# Patient Record
(demographics unavailable — no encounter records)

---

## 2024-10-16 NOTE — PHYSICAL EXAM
[Well Developed] : well developed [Well Nourished] : well nourished [No Acute Distress] : no acute distress [Normal Conjunctiva] : normal conjunctiva [Normal Venous Pressure] : normal venous pressure [No Carotid Bruit] : no carotid bruit [Normal S1, S2] : normal S1, S2 [No Murmur] : no murmur [No Rub] : no rub [No Gallop] : no gallop [Clear Lung Fields] : clear lung fields [Good Air Entry] : good air entry [No Respiratory Distress] : no respiratory distress  [Soft] : abdomen soft [Non Tender] : non-tender [Normal Gait] : normal gait [No Edema] : no edema [No Cyanosis] : no cyanosis [No Rash] : no rash [No Skin Lesions] : no skin lesions [Moves all extremities] : moves all extremities [No Focal Deficits] : no focal deficits [Normal Speech] : normal speech [Alert and Oriented] : alert and oriented [de-identified] : left forearm fistula

## 2024-10-16 NOTE — CARDIOLOGY SUMMARY
[de-identified] : 10/16/24 - normal sinus rhythm, poor R-wave progression [de-identified] : 12/30/22 (regadenoson MIBI) - medium sized moderate predominantly fixed defect in the basal to mid inferior wall and basal inferoseptum consistent with infarct with mild nasir-infarct ischemia, LVEF >70% 09/14/21 (regadenoson tetrofosmin) - medium sized, moderate defects in inferolateral, proximal to mid inferior walls that are reversible, suggestive of ischemia, LVEF 55% [de-identified] : 09/14/21 - MAC, mild-mod MR, mild diastolic dysfunction, normal RV size and function, LVEF 60% [de-identified] : 11/14/23 (PCI) - SYNERGY stents to mRCA 80% and mRCA 99% 11/14/23 (CATH) - pLAD 20%, pCx 40%, mCx 40%, pOM1 50% (small caliber), oRCA 30%, mRCA 80%, mRCA 99% 10/12/21 (PCI) - unsuccessful PCI attempt of mRCA 100% 10/12/21 (CATH) - pLAD 20%, mLAD 20%, pD2 30% (small), pOM3 40% (small), oRCA 40%, mRCA 100% with collaterals from LAD septals ()

## 2024-10-16 NOTE — DISCUSSION/SUMMARY
Rooming/Note    Pt presents to GI clinic with c/o: abdominal pain for about a year    Symptoms: heartburn, hard time finishing food and increasing pressure and belching near the bottom of his rib cage. No nausea/vomitting but does gag sometimes when eating.     Medications:   Verified, no  change    Refills: no  Latex Allergies: no    Communications:    Live well yes  Cell phone: yes ok to leave message     Family hx GI disorders: none        [Coronary Artery Disease] : coronary artery disease [Hypertension] : hypertension [Stable] : stable [FreeTextEntry1] : Currently stable from a cardiovascular standpoint. Normotensive. Appears euvolemic. Stable CAD (s/p mid RCA PCI) with preserved LV systolic function. No ischemic or CHF symptoms. Continue current medications including aspirin. Patient with history of statin intolerance. ECG completed today and reviewed. At this time, patient is considered an acceptable risk from a cardiac standpoint for renal transplant. Follow up in 6 months. [EKG obtained to assist in diagnosis and management of assessed problem(s)] : EKG obtained to assist in diagnosis and management of assessed problem(s)

## 2024-11-05 NOTE — PHYSICAL EXAM
[No Acute Distress] : no acute distress [Well-Appearing] : well-appearing [Normal Sclera/Conjunctiva] : normal sclera/conjunctiva [EOMI] : extraocular movements intact [No Lymphadenopathy] : no lymphadenopathy [Supple] : supple [No Respiratory Distress] : no respiratory distress  [No Accessory Muscle Use] : no accessory muscle use [Clear to Auscultation] : lungs were clear to auscultation bilaterally [Normal Rate] : normal rate  [Regular Rhythm] : with a regular rhythm [Normal S1, S2] : normal S1 and S2 [No Edema] : there was no peripheral edema [No Extremity Clubbing/Cyanosis] : no extremity clubbing/cyanosis [Soft] : abdomen soft [Non Tender] : non-tender [Non-distended] : non-distended [Normal Bowel Sounds] : normal bowel sounds [Normal Affect] : the affect was normal [Normal Insight/Judgement] : insight and judgment were intact [de-identified] : L forearm AV fistula + thrill

## 2024-11-05 NOTE — HISTORY OF PRESENT ILLNESS
[FreeTextEntry1] : cpe/est care [de-identified] : RAI BOGGS is a 72 year F who presents for CPE/est care PMHx ESRD on HD (MWF), CAD s/p stents, hypothyroidism  Feels well overall  Cardio Dr. Fran Solorio Gyn Dr. Bennett Gyn/onc Dr Cam [facility]/Dr. Pizarro

## 2024-11-05 NOTE — ASSESSMENT
[FreeTextEntry1] : Health Care Maintenance - well visit - routine labs 9/2024 reviewed - depression screen negative - ekg follows with cardio  - mammogram due, ordered - pap smear 3/2024 LSIL with high risk HPV, following with gyn/onc Dr. Martinez - colonoscopy 9/2024 GI Dr. Sepulveda - dexa- due, ordered  - flu vaccine reports 10/2024 - covid vaccines reports 3 doses  - tdap- recommend - shingles vaccine- recommend  - pneumonia vaccine- s/p prevnar 20 12/2022 - advised to get annual eye exams with optometry/ophthalmology, skin exams with dermatology, and dental exams  CAD s/p stents  HLD -   9/2024 -unable to tolerate statin, c/w nexletol and asa -follows cardio Dr. Jack Solorio -lipidologist referral given  ESRD on HD MWF -managed by nephro Dr. Pizarro -on transplant list, follows with transplant Dr. Batista  Anemia -recent H/H 10.2/32.1 -likely i/s/o of ESRD  Hypothyroidism -c/w synthroid   RTC in 3-6 months

## 2024-11-05 NOTE — HEALTH RISK ASSESSMENT
[No] : In the past 12 months have you used drugs other than those required for medical reasons? No [No falls in past year] : Patient reported no falls in the past year [0] : 2) Feeling down, depressed, or hopeless: Not at all (0) [PHQ-2 Negative - No further assessment needed] : PHQ-2 Negative - No further assessment needed [Never] : Never [Alone] : lives alone [Retired] : retired [] :  [# Of Children ___] : has [unfilled] children [Feels Safe at Home] : Feels safe at home [Fully functional (bathing, dressing, toileting, transferring, walking, feeding)] : Fully functional (bathing, dressing, toileting, transferring, walking, feeding) [Fully functional (using the telephone, shopping, preparing meals, housekeeping, doing laundry, using] : Fully functional and needs no help or supervision to perform IADLs (using the telephone, shopping, preparing meals, housekeeping, doing laundry, using transportation, managing medications and managing finances) [[id number]] : 0 [MammogramDate] : 11/23 [PapSmearDate] : 03/24 [PapSmearComments] : LSIL with high risk HPV, following with gyn/onc Dr. Martinez [BoneDensityDate] : 09/21 [ColonoscopyDate] : 09/24 [ColonoscopyComments] : GI Dr. Sepulveda  [de-identified] : daughter lives 1 block away

## 2024-11-05 NOTE — HEALTH RISK ASSESSMENT
[No] : In the past 12 months have you used drugs other than those required for medical reasons? No [No falls in past year] : Patient reported no falls in the past year [0] : 2) Feeling down, depressed, or hopeless: Not at all (0) [PHQ-2 Negative - No further assessment needed] : PHQ-2 Negative - No further assessment needed [Never] : Never [Alone] : lives alone [Retired] : retired [] :  [# Of Children ___] : has [unfilled] children [Feels Safe at Home] : Feels safe at home [Fully functional (bathing, dressing, toileting, transferring, walking, feeding)] : Fully functional (bathing, dressing, toileting, transferring, walking, feeding) [Fully functional (using the telephone, shopping, preparing meals, housekeeping, doing laundry, using] : Fully functional and needs no help or supervision to perform IADLs (using the telephone, shopping, preparing meals, housekeeping, doing laundry, using transportation, managing medications and managing finances) [[id number]] : 0 [MammogramDate] : 11/23 [PapSmearDate] : 03/24 [PapSmearComments] : LSIL with high risk HPV, following with gyn/onc Dr. Martinez [BoneDensityDate] : 09/21 [ColonoscopyDate] : 09/24 [ColonoscopyComments] : GI Dr. Sepulveda  [de-identified] : daughter lives 1 block away

## 2024-11-05 NOTE — PHYSICAL EXAM
[No Acute Distress] : no acute distress [Well-Appearing] : well-appearing [Normal Sclera/Conjunctiva] : normal sclera/conjunctiva [EOMI] : extraocular movements intact [No Lymphadenopathy] : no lymphadenopathy [Supple] : supple [No Respiratory Distress] : no respiratory distress  [No Accessory Muscle Use] : no accessory muscle use [Clear to Auscultation] : lungs were clear to auscultation bilaterally [Normal Rate] : normal rate  [Regular Rhythm] : with a regular rhythm [Normal S1, S2] : normal S1 and S2 [No Edema] : there was no peripheral edema [No Extremity Clubbing/Cyanosis] : no extremity clubbing/cyanosis [Soft] : abdomen soft [Non Tender] : non-tender [Non-distended] : non-distended [Normal Bowel Sounds] : normal bowel sounds [Normal Affect] : the affect was normal [Normal Insight/Judgement] : insight and judgment were intact [de-identified] : L forearm AV fistula + thrill

## 2024-11-05 NOTE — HISTORY OF PRESENT ILLNESS
[FreeTextEntry1] : cpe/est care [de-identified] : RAI BOGGS is a 72 year F who presents for CPE/est care PMHx ESRD on HD (MWF), CAD s/p stents, hypothyroidism  Feels well overall  Cardio Dr. Fran Solorio Gyn Dr. Bennett Gyn/onc Dr Cam [facility]/Dr. Pizarro

## 2025-01-28 NOTE — ASSESSMENT
[FreeTextEntry1] : In the office patient went a duplex study which shows a partially thrombosed fistula with poor flow.  Given these findings patient will need an urgent fistulogram and thrombectomy.  Risks and benefits were discussed with the patient who understands and agrees to proceed.  Patient to be sent over to the access center for scheduling either later today or tomorrow.

## 2025-01-28 NOTE — HISTORY OF PRESENT ILLNESS
[FreeTextEntry1] : 72-year-old female with multiple medical problems including coronary artery disease, hyperlipidemia, hypertension currently on hemodialysis via left radiocephalic AV fistula. Patient states that the fistula had been working well.  There was some issues yesterday at the end of hemodialysis.  She presents to the office today for duplex and for evaluation of the fistula. [] : left radiocephalic fistula

## 2025-01-28 NOTE — PHYSICAL EXAM
[Normal] : normal rate, regular rhythm, normal S1/S2, no murmur [Thrill] : no thrill [Pulsatile Thrill] : pulsatile thrill [Aneurysm] : no aneurysm [Bleeding] : no bleeding [Hand well perfused] : hand well perfused [Foot well perfused] : foot well perfused [Warm Extremities] : warm extremities

## 2025-01-29 NOTE — PAST MEDICAL HISTORY
[Increasing age ( >40 years old)] : Increasing age ( >40 years old) [No therapy indicated for cases scheduled for less than one hour] : No therapy indicated for cases scheduled for less than one hour. [FreeTextEntry1] : \par  \par  Malignant Hyperthermis (MH) Screening Tool and Risk of Bleeding Assessement\par  Ms. RAI BOGGS  denies family history of unexpected death following Anesthesia or Exercise.\par  Denies Family history of Malignant Hyperthermia, Muscle or Neuromuscular disorder and High Temperature following exercise.\par  \par  Ms. RAI BOGGS denies history of Muscle Spasm, Dark or Chocolate - Colored urine and Unanticipated fever immediately following anesthesia or serious exercise. \par  Ms. BOGGS  also denies bleeding tendencies/ Risks of Bleeding\par

## 2025-01-29 NOTE — HISTORY OF PRESENT ILLNESS
[] : right internal jugular tunneled catheter [FreeTextEntry1] : alert and oriented x3  accompanied by daughter Amy [phone] took synthroid and aspirin this morning     [FreeTextEntry2] : Right catheter exchange 6/3/22 [FreeTextEntry4] : Monday  [FreeTextEntry5] : yesterday 7pm [FreeTextEntry6] : Dr Rod

## 2025-01-29 NOTE — PROCEDURE
[D/C IV on discharge] : D/C IV on discharge [Resume diet] : resume diet [Site check for bleeding/hematoma] : Site check for bleeding/hematoma [Vital signs on admission the q 15 mins x2] : Vital signs on admission the q 15 mins x2 [FreeTextEntry1] : left fistula, thrombectomy  [FreeTextEntry3] : 2mg/3ml of tpa and sterile water instilled into left fistula by Dr Adams at 815am with 15cm unifuse catheter

## 2025-01-29 NOTE — PROCEDURE
Ok for early refill?    [phone]  Please call the pharmacy today    [D/C IV on discharge] : D/C IV on discharge [Resume diet] : resume diet [Site check for bleeding/hematoma] : Site check for bleeding/hematoma [Vital signs on admission the q 15 mins x2] : Vital signs on admission the q 15 mins x2 [FreeTextEntry1] : left fistula, thrombectomy  [FreeTextEntry3] : 2mg/3ml of tpa and sterile water instilled into left fistula by Dr Adams at 815am with 15cm unifuse catheter

## 2025-01-29 NOTE — HISTORY OF PRESENT ILLNESS
[] : right internal jugular tunneled catheter [FreeTextEntry1] : alert and oriented x3  accompanied by daughter Amy [phone] took synthroid and aspirin this morning     [FreeTextEntry2] : Right catheter exchange 6/3/22 [FreeTextEntry4] : Monday  [FreeTextEntry5] : yesterday 7pm [FreeTextEntry6] : Dr Rod

## 2025-01-29 NOTE — ASSESSMENT
[Other: _____] : [unfilled] [FreeTextEntry1] : Patient presents with clotted access, plan for left fistula thrombectomy

## 2025-04-16 NOTE — DISCUSSION/SUMMARY
[Coronary Artery Disease] : coronary artery disease [Hypertension] : hypertension [Stable] : stable [FreeTextEntry1] : Currently stable from a cardiovascular standpoint. Normotensive. Appears euvolemic. Stable CAD (s/p mid RCA PCI) with preserved LV systolic function. No ischemic or CHF symptoms. Will start Repatha for management of elevated LDL. Continue current medications including aspirin. Patient with history of statin intolerance. ECG completed today and reviewed. Will schedule a pharmacologic nuclear stress test for cardiac risk stratification. In addition, will schedule an echo to reassess her cardiac structures and function. At this time, patient is considered an acceptable risk from a cardiac standpoint for renal transplant. Follow up in 6 months. [EKG obtained to assist in diagnosis and management of assessed problem(s)] : EKG obtained to assist in diagnosis and management of assessed problem(s)

## 2025-04-16 NOTE — CARDIOLOGY SUMMARY
[de-identified] : 04/16/25 - normal sinus rhythm, poor R-wave progression, nonspecific ST abnormality [de-identified] : 12/30/22 (regadenoson MIBI) - medium sized moderate predominantly fixed defect in the basal to mid inferior wall and basal inferoseptum consistent with infarct with mild nasir-infarct ischemia, LVEF >70% 09/14/21 (regadenoson tetrofosmin) - medium sized, moderate defects in inferolateral, proximal to mid inferior walls that are reversible, suggestive of ischemia, LVEF 55% [de-identified] : 09/14/21 - MAC, mild-mod MR, mild diastolic dysfunction, normal RV size and function, LVEF 60% [de-identified] : 11/14/23 (PCI) - SYNERGY stents to mRCA 80% and mRCA 99% 11/14/23 (CATH) - pLAD 20%, pCx 40%, mCx 40%, pOM1 50% (small caliber), oRCA 30%, mRCA 80%, mRCA 99% 10/12/21 (PCI) - unsuccessful PCI attempt of mRCA 100% 10/12/21 (CATH) - pLAD 20%, mLAD 20%, pD2 30% (small), pOM3 40% (small), oRCA 40%, mRCA 100% with collaterals from LAD septals ()

## 2025-04-16 NOTE — PHYSICAL EXAM
[Well Developed] : well developed [Well Nourished] : well nourished [No Acute Distress] : no acute distress [Normal Conjunctiva] : normal conjunctiva [Normal Venous Pressure] : normal venous pressure [No Carotid Bruit] : no carotid bruit [Normal S1, S2] : normal S1, S2 [No Murmur] : no murmur [No Rub] : no rub [No Gallop] : no gallop [Clear Lung Fields] : clear lung fields [Good Air Entry] : good air entry [No Respiratory Distress] : no respiratory distress  [Soft] : abdomen soft [Non Tender] : non-tender [Normal Gait] : normal gait [No Edema] : no edema [No Cyanosis] : no cyanosis [No Rash] : no rash [No Skin Lesions] : no skin lesions [Moves all extremities] : moves all extremities [No Focal Deficits] : no focal deficits [Normal Speech] : normal speech [Alert and Oriented] : alert and oriented [de-identified] : left forearm fistula

## 2025-04-22 NOTE — HISTORY OF PRESENT ILLNESS
[FreeTextEntry1] : 72-year-old female with multiple medical problems including coronary artery disease, hyperlipidemia, hypertension currently on hemodialysis via left radiocephalic AV fistula who presents the office today for surveillance. Patient reports sluggish flow in distal cannulation site. [] : left radiocephalic fistula

## 2025-04-22 NOTE — PHYSICAL EXAM
[Normal] : normal rate, regular rhythm, normal S1/S2, no murmur [Thrill] : no thrill [Pulsatile Thrill] : pulsatile thrill [Aneurysm] : no aneurysm [Bleeding] : no bleeding [Hand well perfused] : hand well perfused [Foot well perfused] : foot well perfused [Warm Extremities] : warm extremities [de-identified] : Intact

## 2025-04-22 NOTE — ASSESSMENT
[FreeTextEntry1] : 72-year-old female with end-stage renal disease currently on hemodialysis via left radiocephalic AV fistula.  In the office today, patient underwent duplex which demonstrates a high-grade stenosis in the fistula with inadequate flow.  Given these findings, patient to be scheduled for left arm fistulogram.

## 2025-05-08 NOTE — PHYSICAL EXAM
[Well Developed] : well developed [No Acute Distress] : no acute distress [Normocephalic] : normocephalic [Atraumatic] : atraumatic [Sclera Anicteric] : sclera anicteric [Neck Supple] : neck supple [Breathing Comfortably on RA] : breathing comfortably on room air [Soft] : soft [Non-tender] : non-tender [In Left Arm] : fistula/graft in left arm [Alert] : alert [Responds to Questions Appropriately] : responds to questions appropriately [Oriented] : oriented [Appropriate] : appropriate [Normal] : normal [Clean] : clean [Dry] : dry [Healing Well] : healing well [Bleeding] : no active bleeding [Foul Odor] : no foul smell [Purulent Drainage] : no purulent drainage [Serosanguinous Drainage] : no serosanguinous drainage [Erythema] : not erythematous [Warm] : not warm [Tender] : not tender

## 2025-05-08 NOTE — REVIEW OF SYSTEMS
[Sclera anicteric] : sclera anicteric [Fever] : no fever [Chills] : no chills [Recent Weight Gain (___ Lbs)] : no recent weight gain [Recent Weight Loss (___ Lbs)] : no recent weight loss [Sore throat] : no sore throat [Pain/Stiffness] : no pain/stiffness [Trauma] : no trauma [Chest Pain] : no chest pain [Palpitations] : no palpitations [SOB] : no shortness of breath [Wheezing] : no wheezing [Cough] : no cough [Abdominal Pain] : no abdominal pain [Nausea] : no nausea [Constipation] : no constipation [Diarrhea] : diarrhea [Vomiting] : no vomiting [Dysuria] : no dysuria [Hematuria] : no hematuria [UTI] : no UTI [Itching] : no itching [Skin Rash] : no skin rash [Headache] : no headache [Dizziness] : no dizziness [Fainting] : no fainting [Confusion] : no confusion [Seizures] : no seizures [Adenopathy] : no adenopathy

## 2025-05-08 NOTE — REASON FOR VISIT
[Initial] : an initial visit  [Family Member] : family member [FreeTextEntry3] : DDRT [FreeTextEntry5] : 04/28/2025

## 2025-05-08 NOTE — HISTORY OF PRESENT ILLNESS
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      - Interval events: 05/07/2025  Patient presents to the office today for her first follow up visit after discharge on 05/05/2025   Patient reports feeling well with no significant complaint at this time.   Voids normally >> 800 ml daily. Still on HD > Getting a session today in the afternoon.  2 Drains are still in > Daughter was measuring the output collectively from both of them >> Advised to measure them separately.  Wound is healing nicely.   Patient reports average appetite, and normal bowel movements.   Physical activity is improving.   Patient denies fever, chills, chest pain, SOB, abdominal pain, nausea, vomiting, headache, dizziness or seizures.

## 2025-05-08 NOTE — PLAN
[FreeTextEntry1] : - Patient is recovering well after surgery. Just left the hospital 2 days ago.  - Graft function is delayed > on HD, and getting a session today. UOP is 800 daily and is increasing >> will keep monitoring UOP and Labs to decide on when to hold HD.  - Labs were sent today and will be reviewed  - Medications were reviewed.         - Will adjust Tacro dose based on today's level. - Needs to see a cardiologist for the PVCs.  - Ureteral stent still in >> Will take out in 4-6 weeks post transplant  Patient to follow up as per our protocol.

## 2025-05-15 NOTE — ASSESSMENT
[FreeTextEntry1] : Renal Transplant recipient: Noted allograft function, creatinine at discharge, gabrielle creatinine. Reviewed for urinary symptoms/fever/chills/pain/new symptoms. Tolerating medications. DGF, Last hemodialysis 1 week prior, now non oliguric. Drains removed today, has >1L urine output. Will monitor for continued recovery of kisdney function Lab data from last visit reviewed including allograft function, urinalysis, any viremia and trough level of medication as well as any imaging reports. HCV: Being started on Epclusa from today Immunosuppression: reviewed; Noted induction regimen, maintenance regimen and reviewed target trough level. Has flow sheets to maintain home charts of glucose , blood pressure, temperature, weight and urine output. Hypertension: controlled; Reviewed medications.  Reviewed target for blood pressure control. Cardiovascular risk reduction, primary/secondary prevention measures were discussed as appropriate.   Prophylaxis: Reviewed antimicrobial and GI prophylaxis as well as precautions to prevent infections. Discussed ambulation, using incentive spirometer, optimal glucose and blood pressure readings, adherence with medications and follow ups, follow up clinic visit schedule, avoiding dehydration, mosquito bites; prevention of DVT as well as food safety. Patient has met with transplant surgeon post transplant; post op wound care ureteral stent removal and follow up care were discussed. Advised to bring flow sheets and medication list at every visit. Discussed opthalmology and dermatology checks at least yearly and vaccinations. Flu vaccine yearly and Pneumonia vaccine every 5 years. Copy of office visit and lab reports are being sent to primary physician and referring nephrologist.

## 2025-05-15 NOTE — REASON FOR VISIT
[Follow-Up] : a follow-up visit [Other: _____] : [unfilled] [FreeTextEntry1] : Here for post transplant follow up visit

## 2025-05-15 NOTE — HISTORY OF PRESENT ILLNESS
[FreeTextEntry1] : She is here for post follow up. Last dialysis was on 5/9/25. Reports she is passing >1000 ml/urine. Has discomfort from Drains, only 5-10 ml/day for last 2-3 days from each drain. Reports no fever/dysuria/hematuria. Feels good appetite, sleeps ok. Reports no SOB.   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 Pt progressed well from a surgical perspective. Tolerated regular diet, had bowel function, had good pain control with minimal narcotics, and ambulated well with PT. She tolerated all of the new immunosuppression medication regimen. Medication/diet education was provided regularly by Transplant Pharmacy and Nutrition teams. She was followed closely by our multidisciplinary transplant team:  Surgeons, Hepatologists, ID, Nephrologists, Pharmacists, [place], ACPs, RNs, SW, Coordinators, Dieticians, PT/OT and was deemed safe for discharge with the  following plan:    [] DDRT, HCV+ donor - Renal Doppler: patent w some elevated velocities --> repeat stable, patent, homogenous flow - DGF: HD 4/29, 5/1, 5/3, 5/5 to continue HD outpatient MWF - HCV treatment pending HCV genotype, to follow up outpatient - d/cd with 2 ESTEFANI drains [] Immuno - Completed Thymo induction (total 300 mg) -FK 8,  BID, Pred 5 -PPx: bactrim/Valcyte/Nystatin/Famotidine  [] HTN/CAD - ASA 81 - Frequent PVCs on tele, asymptomatic: Cards c/s - coreg 6.25 bid     Medications   aspirin 81 mg /d Bactrim 400 mg-80 mg /d famotidine 20 mg /d Lasix 80 mg once daily levothyroxine 75 mcg /d mycophenolate mofetil 250 mg oral capsule: 500 mg BID nystatin 100,000 units/mL oral suspension: 5 milliliter(s) orally 4 times a day predniSONE 5 mg /d Envarsus 7 mg/d valGANciclovir 450 mg TIW Senna 1 tablet/d Epclusa  daily starting 5/15/25)- picking up from [facility] today Magnesium Oxide 400 mg once daily   Drain output 5/10 ml/d for 3 days, removed today (5/15/25)

## 2025-05-15 NOTE — PHYSICAL EXAM
[General Appearance - Alert] : alert [General Appearance - In No Acute Distress] : in no acute distress [Sclera] : the sclera and conjunctiva were normal [Outer Ear] : the ears and nose were normal in appearance [Jugular Venous Distention Increased] : there was no jugular-venous distention [Auscultation Breath Sounds / Voice Sounds] : lungs were clear to auscultation bilaterally [Heart Sounds Gallop] : no gallops [Heart Sounds Pericardial Friction Rub] : no pericardial rub [Edema] : there was no peripheral edema [Abdomen Soft] : soft [Cervical Lymph Nodes Enlarged Posterior Bilaterally] : posterior cervical [Cervical Lymph Nodes Enlarged Anterior Bilaterally] : anterior cervical [Involuntary Movements] : no involuntary movements were seen [___ (cm) Fistula] : [unfilled] (cm) fistula [] : no rash [No Focal Deficits] : no focal deficits [Oriented To Time, Place, And Person] : oriented to person, place, and time [Impaired Insight] : insight and judgment were intact [FreeTextEntry1] : DULCE ESTEFANI drains, staples+

## 2025-05-22 NOTE — HISTORY OF PRESENT ILLNESS
[FreeTextEntry1] : She is here for post follow up. Last dialysis was on 5/9/25. 1700 to 2000 ml urine Reports no fever/dysuria/hematuria. Feels good appetite, sleeps ok. Reports no SOB.   72F PMH CAD s/p stents x2 (on ASA81), ESRD on HD MWF via LUE AVF (had HD catheter initially) since 2021 now s/p HCV + DDRT with 1a/1v/1u with stent under thymo induction, course complicated by delayed graft function requiring HD and elevated lactate that cleared with minimal intervention. Pt progressed well from a surgical perspective. Tolerated regular diet, had bowel function, had good pain control with minimal narcotics, and ambulated well with PT. She tolerated all of the new immunosuppression medication regimen. Medication/diet education was provided regularly by Transplant Pharmacy and Nutrition teams. She was followed closely by our multidisciplinary transplant team:  Surgeons, Hepatologists, ID, Nephrologists, Pharmacists, [place], ACPs, RNs, SW, Coordinators, Dieticians, PT/OT and was deemed safe for discharge with the  following plan:    [] DDRT, HCV+ donor - Renal Doppler: patent w some elevated velocities --> repeat stable, patent, homogenous flow - DGF: Recovered - HCV treatment pending HCV genotype, to follow up outpatient - d/cd   ESTEFANI drains [] Immuno - Completed Thymo induction (total 300 mg) -FK 8,  BID, Pred 5 -PPx: bactrim/Valcyte/Nystatin/Famotidine  [] HTN/CAD - ASA 81 - Frequent PVCs on tele, asymptomatic: Cards c/s - coreg 6.25 bid     Medications   aspirin 81 mg /d Bactrim 400 mg-80 mg /d famotidine 20 mg /d Lasix 80 mg once daily- changed to 40 mg alternate levothyroxine 75 mcg /d mycophenolate mofetil 250 mg oral capsule: 500 mg BID nystatin 100,000 units/mL oral suspension: 5 milliliter(s) orally 4 times a day predniSONE 5 mg /d Envarsus 7 mg/d valGANciclovir 450 mg TIW Senna 1 tablet/d Epclusa  daily (started 5/15/25) Magnesium Oxide 400 mg once daily   Drains have been removed. Staples + stent+

## 2025-05-22 NOTE — PHYSICAL EXAM
[General Appearance - Alert] : alert [General Appearance - In No Acute Distress] : in no acute distress [Sclera] : the sclera and conjunctiva were normal [Outer Ear] : the ears and nose were normal in appearance [Jugular Venous Distention Increased] : there was no jugular-venous distention [Auscultation Breath Sounds / Voice Sounds] : lungs were clear to auscultation bilaterally [Heart Sounds Gallop] : no gallops [Heart Sounds Pericardial Friction Rub] : no pericardial rub [Edema] : there was no peripheral edema [Abdomen Soft] : soft [FreeTextEntry1] : DULCE JARA drain site healing, staples+ [Cervical Lymph Nodes Enlarged Posterior Bilaterally] : posterior cervical [Cervical Lymph Nodes Enlarged Anterior Bilaterally] : anterior cervical [Involuntary Movements] : no involuntary movements were seen [___ (cm) Fistula] : [unfilled] (cm) fistula [] : no rash [No Focal Deficits] : no focal deficits [Oriented To Time, Place, And Person] : oriented to person, place, and time [Impaired Insight] : insight and judgment were intact

## 2025-05-22 NOTE — ASSESSMENT
[FreeTextEntry1] : Renal Transplant recipient: Noted allograft function, creatinine at discharge, gabrielle creatinine. Reviewed for urinary symptoms/fever/chills/pain/new symptoms. Tolerating medications. Will monitor for continued recovery of kidney function Lab data from last visit reviewed including allograft function, urinalysis, any viremia and trough level of medication as well as any imaging reports. HCV: Being started on Epclusa from 5/15 Bacteriuria: Started on Cipro. Immunosuppression: reviewed; Noted induction regimen, maintenance regimen and reviewed target trough level. Has flow sheets to maintain home charts of glucose , blood pressure, temperature, weight and urine output. Hypertension: controlled; Reviewed medications.  Reviewed target for blood pressure control. Cardiovascular risk reduction, primary/secondary prevention measures were discussed as appropriate.   Prophylaxis: Reviewed antimicrobial and GI prophylaxis as well as precautions to prevent infections. Discussed ambulation, using incentive spirometer, optimal glucose and blood pressure readings, adherence with medications and follow ups, follow up clinic visit schedule, avoiding dehydration, mosquito bites; prevention of DVT as well as food safety. Patient has met with transplant surgeon post transplant; post op wound care ureteral stent removal and follow up care were discussed. Advised to bring flow sheets and medication list at every visit. Discussed ophthalmology and dermatology checks at least yearly and vaccinations. Flu vaccine yearly and Pneumonia vaccine every 5 years. Copy of office visit and lab reports are being sent to primary physician and referring nephrologist.

## 2025-06-18 NOTE — HISTORY OF PRESENT ILLNESS
[FreeTextEntry1] : Currently doing okay. Denies chest pain, shortness of breath or palpitations. Patient had HCV+ DDRT on 04/28/25. Course complicated by delayed graft function.

## 2025-06-18 NOTE — CARDIOLOGY SUMMARY
[de-identified] : 04/16/25 - normal sinus rhythm, poor R-wave progression, nonspecific ST abnormality [de-identified] : 12/30/22 (regadenoson MIBI) - medium sized moderate predominantly fixed defect in the basal to mid inferior wall and basal inferoseptum consistent with infarct with mild nasir-infarct ischemia, LVEF >70% 09/14/21 (regadenoson tetrofosmin) - medium sized, moderate defects in inferolateral, proximal to mid inferior walls that are reversible, suggestive of ischemia, LVEF 55% [de-identified] : 09/14/21 - MAC, mild-mod MR, mild diastolic dysfunction, normal RV size and function, LVEF 60% [de-identified] : 11/14/23 (PCI) - SYNERGY stents to mRCA 80% and mRCA 99% 11/14/23 (CATH) - pLAD 20%, pCx 40%, mCx 40%, pOM1 50% (small caliber), oRCA 30%, mRCA 80%, mRCA 99% 10/12/21 (PCI) - unsuccessful PCI attempt of mRCA 100% 10/12/21 (CATH) - pLAD 20%, mLAD 20%, pD2 30% (small), pOM3 40% (small), oRCA 40%, mRCA 100% with collaterals from LAD septals ()

## 2025-06-18 NOTE — PHYSICAL EXAM
[Well Developed] : well developed [Well Nourished] : well nourished [No Acute Distress] : no acute distress [Normal Conjunctiva] : normal conjunctiva [Normal Venous Pressure] : normal venous pressure [No Carotid Bruit] : no carotid bruit [Normal S1, S2] : normal S1, S2 [No Murmur] : no murmur [No Rub] : no rub [No Gallop] : no gallop [Clear Lung Fields] : clear lung fields [Good Air Entry] : good air entry [No Respiratory Distress] : no respiratory distress  [Soft] : abdomen soft [Non Tender] : non-tender [Normal Gait] : normal gait [No Edema] : no edema [No Cyanosis] : no cyanosis [No Rash] : no rash [No Skin Lesions] : no skin lesions [Moves all extremities] : moves all extremities [No Focal Deficits] : no focal deficits [Normal Speech] : normal speech [Alert and Oriented] : alert and oriented [de-identified] : left forearm fistula

## 2025-06-19 NOTE — HISTORY OF PRESENT ILLNESS
[FreeTextEntry1] : She is here for post follow up. Recovered DGF. Has been off dialysis after 5/9/25 (last session).   Reports no fever/dysuria/hematuria. Feels good appetite, sleeps ok.   Has seen Dr. Solorio cardiologist No fever/dysuria.  History reviewed 72F PMH CAD s/p stents x2 (on ASA81), ESRD on HD MWF via LUE AVF (had HD catheter initially) since 2021 now s/p HCV + DDRT with 1a/1v/1u with stent under thymo induction, course complicated by delayed graft function requiring HD and elevated lactate that cleared with minimal intervention. Pt progressed well from a surgical perspective. Tolerated regular diet, had bowel function, had good pain control with minimal narcotics, and ambulated well with PT. She tolerated all of the new immunosuppression medication regimen. Medication/diet education was provided regularly by Transplant Pharmacy and Nutrition teams.     [] DDRT, HCV+ donor - Renal Doppler: patent w some elevated velocities --> repeat stable, patent, homogenous flow - DGF: Recovered  [] Immuno - Completed Thymo induction (total 300 mg)       Medications   aspirin 81 mg /d Bactrim 400 mg-80 mg /d famotidine 20 mg /d Lasix 40 mg/ prn every other day- changed to 20 mg/dose levothyroxine 75 mcg /d mycophenolate mofetil: 500 mg BID predniSONE 5 mg /d Envarsus  6 mg/d valGANciclovir 450 mg daily (changed from 3/week) Senna 1 tablet/d Epclusa  daily (started 5/15/25) Magnesium Oxide 400 mg once daily Allopurinol 100 mg/day  Today she is accompanied by her daughter  Doing well overall hip pains.  reviewed labs with patient. Uric acid added

## 2025-06-19 NOTE — PHYSICAL EXAM
[General Appearance - Alert] : alert [General Appearance - In No Acute Distress] : in no acute distress [Sclera] : the sclera and conjunctiva were normal [Outer Ear] : the ears and nose were normal in appearance [Jugular Venous Distention Increased] : there was no jugular-venous distention [Auscultation Breath Sounds / Voice Sounds] : lungs were clear to auscultation bilaterally [Heart Sounds Gallop] : no gallops [Heart Sounds Pericardial Friction Rub] : no pericardial rub [Edema] : there was no peripheral edema [Abdomen Soft] : soft [Cervical Lymph Nodes Enlarged Posterior Bilaterally] : posterior cervical [Cervical Lymph Nodes Enlarged Anterior Bilaterally] : anterior cervical [Involuntary Movements] : no involuntary movements were seen [___ (cm) Fistula] : [unfilled] (cm) fistula [] : no rash [No Focal Deficits] : no focal deficits [Oriented To Time, Place, And Person] : oriented to person, place, and time [Impaired Insight] : insight and judgment were intact [FreeTextEntry1] : DULCE JARA drain site healing, staples+

## 2025-06-19 NOTE — ASSESSMENT
[FreeTextEntry1] : Renal Transplant recipient: Noted allograft function, creatinine at discharge, gabrielle creatinine. Reviewed for urinary symptoms/fever/chills/pain/new symptoms. Tolerating medications. Will monitor  kidney function, tacrolimus trough level Musculoskeletal pain: Both hips posteriorly, lower back. Discussed symptomatic measures. Lab data from last visit reviewed including allograft function, urinalysis, any viremia and trough level of medication as well as any imaging reports. HCV: She started on Epclusa from 5/15, f/u labs Bacteriuria: Completed Cipro, no symptoms, urine no bacteria at present Immunosuppression: reviewed; Noted induction regimen, maintenance regimen and reviewed target trough level. Has flow sheets to maintain home charts of glucose , blood pressure, temperature, weight and urine output. Hypertension: controlled; Reviewed medications.  Reviewed target for blood pressure control. Cardiovascular risk reduction, primary/secondary prevention measures were discussed as appropriate.   Prophylaxis: Reviewed antimicrobial and GI prophylaxis as well as precautions to prevent infections. Discussed ambulation,  optimal   blood pressure readings, adherence with medications and follow ups, follow up clinic visit schedule, avoiding dehydration, mosquito bites; prevention of DVT as well as food safety. Patient has met with transplant surgeon post transplant;  Advised to bring flow sheets and medication list at every visit. Discussed ophthalmology and dermatology checks at least yearly and vaccinations. Flu vaccine yearly and Pneumonia vaccine every 5 years at last visit.  Copy of office visit and lab reports are being sent to primary physician and referring nephrologist.

## 2025-07-10 NOTE — HISTORY OF PRESENT ILLNESS
[FreeTextEntry1] : She is here for post follow up. Recovered DGF. Has been off dialysis after 5/9/25 (last session).   Reports no fever/dysuria/hematuria. Feels good appetite, sleeps ok.   Has seen Dr. Solorio cardiologist No fever/dysuria.  History reviewed 72F PMH CAD s/p stents x2 (on ASA81), ESRD on HD MWF via LUE AVF (had HD catheter initially) since 2021 now s/p HCV + DDRT with 1a/1v/1u with stent under thymo induction, course complicated by delayed graft function requiring HD and elevated lactate that cleared with minimal intervention. Pt progressed well from a surgical perspective. Tolerated regular diet, had bowel function, had good pain control with minimal narcotics, and ambulated well with PT. She tolerated all of the new immunosuppression medication regimen. Medication/diet education was provided regularly by Transplant Pharmacy and Nutrition teams.     [] DDRT, HCV+ donor - Renal Doppler: patent w some elevated velocities --> repeat stable, patent, homogenous flow - DGF: Recovered  [] Immuno - Completed Thymo induction (total 300 mg)      Medications : updated 7/10/25  aspirin 81 mg /d Bactrim 400 mg-80 mg /d famotidine 20 mg /d Lasix 20 mg/  every other day- changed to daily levothyroxine 75 mcg /d mycophenolate mofetil: 500 mg BID predniSONE 5 mg /d Envarsus  6 mg/d valganciclovir 450 mg daily  Senna 1 tablet/d Epclusa  daily (started 5/15/25) Magnesium Oxide 400 mg once daily Allopurinol 100 mg/day- increased to 200 mg/d (7/10/25) will increase to 300/d after 1 week  Today she is accompanied by her daughter  Doing well overall has aches and pains. Sleeps only 4 h/d Reviewed labs with patient.

## 2025-07-10 NOTE — ASSESSMENT
[FreeTextEntry1] : Renal Transplant recipient: Noted allograft function, creatinine at discharge, gabrielle creatinine. Reviewed for urinary symptoms/fever/chills/pain/new symptoms. Tolerating medications. Will monitor  kidney function, tacrolimus trough level Musculoskeletal pain: Both hips posteriorly, lower back. Discussed symptomatic measures. Lab data from last visit reviewed including allograft function, urinalysis, any viremia and trough level of medication as well as any imaging reports. HCV: She started on Epclusa from 5/15, f/u labs as per protocol Bacteriuria: Completed Cipro, no symptoms, urine no bacteria at present Immunosuppression: reviewed; Noted induction regimen, maintenance regimen and reviewed target trough level. Has flow sheets to maintain home charts of glucose , blood pressure, temperature, weight and urine output. Hypertension: controlled; Reviewed medications.  Reviewed target for blood pressure control. Cardiovascular risk reduction, primary/secondary prevention measures were discussed as appropriate.   Prophylaxis: Reviewed antimicrobial and GI prophylaxis as well as precautions to prevent infections. Discussed ambulation,  optimal   blood pressure readings, adherence with medications and follow ups, follow up clinic visit schedule, avoiding dehydration, mosquito bites; prevention of DVT as well as food safety. Patient has met with transplant surgeon post transplant;  Advised to bring flow sheets and medication list at every visit. Discussed ophthalmology and dermatology checks at least yearly and vaccinations. Flu vaccine yearly and Pneumonia vaccine every 5 years at last visit. Copy of office visit and lab reports are being sent to primary physician and referring nephrologist.

## 2025-07-10 NOTE — PHYSICAL EXAM
[General Appearance - Alert] : alert [General Appearance - In No Acute Distress] : in no acute distress [Sclera] : the sclera and conjunctiva were normal [Outer Ear] : the ears and nose were normal in appearance [Jugular Venous Distention Increased] : there was no jugular-venous distention [Auscultation Breath Sounds / Voice Sounds] : lungs were clear to auscultation bilaterally [Heart Sounds Gallop] : no gallops [Heart Sounds Pericardial Friction Rub] : no pericardial rub [Edema] : there was no peripheral edema [Abdomen Soft] : soft [Cervical Lymph Nodes Enlarged Posterior Bilaterally] : posterior cervical [Cervical Lymph Nodes Enlarged Anterior Bilaterally] : anterior cervical [Involuntary Movements] : no involuntary movements were seen [___ (cm) Fistula] : [unfilled] (cm) fistula [] : no rash [No Focal Deficits] : no focal deficits [Oriented To Time, Place, And Person] : oriented to person, place, and time [Impaired Insight] : insight and judgment were intact [FreeTextEntry1] : RLQ healed incision.

## 2025-07-24 NOTE — HISTORY OF PRESENT ILLNESS
[FreeTextEntry1] : She is here for post follow up. Recovered DGF. Has been off dialysis after 5/9/25 (last session).   Reports no fever/dysuria/hematuria. Feels good appetite, sleeps ok.   Has seen Dr. Solorio cardiologist No fever/dysuria.  History reviewed 72F PMH CAD s/p stents x2 (on ASA81), ESRD on HD MWF via LUE AVF (had HD catheter initially) since 2021 now s/p HCV + DDRT with 1a/1v/1u with stent under thymo induction, course complicated by delayed graft function requiring HD and elevated lactate that cleared with minimal intervention. Pt progressed well from a surgical perspective. Tolerated regular diet, had bowel function, had good pain control with minimal narcotics, and ambulated well with PT. She tolerated all of the new immunosuppression medication regimen. Medication/diet education was provided regularly by Transplant Pharmacy and Nutrition teams.     [] DDRT, HCV+ donor - Renal Doppler: patent w some elevated velocities --> repeat stable, patent, homogenous flow - DGF: Recovered  [] Immuno - Completed Thymo induction (total 300 mg)      Medications: updated 7/10/25  aspirin 81 mg /d Bactrim 400 mg-80 mg /d famotidine 20 mg /d Lasix 20 mg daily levothyroxine 75 mcg /d mycophenolate mofetil: 500 mg BID predniSONE 5 mg /d Envarsus  6 mg/d valganciclovir 450 mg daily  Senna 1 tablet/d Epclusa daily (started 5/15/25) Magnesium Oxide 400 mg once daily Allopurinol 100 mg/day- increased to 200 mg/d (7/10/25)  Sodium bicarbonate 650 mg BID  Today she is accompanied by her daughter  Doing well overall has aches and pains.   Reviewed labs with patient.

## 2025-07-24 NOTE — ASSESSMENT
[FreeTextEntry1] : Renal Transplant recipient: Noted allograft function, creatinine at discharge, gabrielle creatinine. Reviewed for urinary symptoms/fever/chills/pain/new symptoms. Tolerating medications. Will monitor  kidney function, tacrolimus trough level Musculoskeletal pain: Both hips posteriorly, lower back. Discussed symptomatic measures. Lab data from last visit reviewed including allograft function, urinalysis, any viremia and trough level of medication as well as any imaging reports. HCV: She started on Epclusa from 5/15, f/u labs as per protocol Immunosuppression: reviewed; Noted induction regimen, maintenance regimen and reviewed target trough level. Has flow sheets to maintain home charts of glucose , blood pressure, temperature, weight and urine output. Hypertension: controlled; Reviewed medications.  Reviewed target for blood pressure control. Cardiovascular risk reduction, primary/secondary prevention measures were discussed as appropriate.   Prophylaxis: Reviewed antimicrobial and GI prophylaxis as well as precautions to prevent infections. Discussed ambulation,  optimal   blood pressure readings, adherence with medications and follow ups, follow up clinic visit schedule, avoiding dehydration, mosquito bites; prevention of DVT as well as food safety. Patient has met with transplant surgeon post transplant;  Advised to bring flow sheets and medication list at every visit. Discussed ophthalmology and dermatology checks at least yearly and vaccinations. Flu vaccine yearly and Pneumonia vaccine every 5 years at last visit. Copy of office visit and lab reports are being sent to primary physician and referring nephrologist.